# Patient Record
Sex: MALE | Race: WHITE | NOT HISPANIC OR LATINO | Employment: OTHER | URBAN - METROPOLITAN AREA
[De-identification: names, ages, dates, MRNs, and addresses within clinical notes are randomized per-mention and may not be internally consistent; named-entity substitution may affect disease eponyms.]

---

## 2020-01-24 ENCOUNTER — PREP FOR SURGERY (OUTPATIENT)
Dept: OTHER | Facility: HOSPITAL | Age: 70
End: 2020-01-24

## 2020-01-24 ENCOUNTER — OFFICE VISIT (OUTPATIENT)
Dept: GASTROENTEROLOGY | Facility: CLINIC | Age: 70
End: 2020-01-24

## 2020-01-24 VITALS
OXYGEN SATURATION: 99 % | HEART RATE: 60 BPM | WEIGHT: 288 LBS | SYSTOLIC BLOOD PRESSURE: 154 MMHG | BODY MASS INDEX: 40.32 KG/M2 | HEIGHT: 71 IN | DIASTOLIC BLOOD PRESSURE: 80 MMHG | TEMPERATURE: 98.1 F

## 2020-01-24 DIAGNOSIS — K21.00 GASTROESOPHAGEAL REFLUX DISEASE WITH ESOPHAGITIS: Primary | ICD-10-CM

## 2020-01-24 DIAGNOSIS — K63.5 POLYP OF COLON, UNSPECIFIED PART OF COLON, UNSPECIFIED TYPE: Primary | ICD-10-CM

## 2020-01-24 DIAGNOSIS — Z12.11 SCREENING FOR COLON CANCER: ICD-10-CM

## 2020-01-24 DIAGNOSIS — R07.9 CHEST PAIN, UNSPECIFIED TYPE: ICD-10-CM

## 2020-01-24 DIAGNOSIS — K22.70 BARRETT'S ESOPHAGUS WITHOUT DYSPLASIA: ICD-10-CM

## 2020-01-24 DIAGNOSIS — K63.5 POLYP OF COLON, UNSPECIFIED PART OF COLON, UNSPECIFIED TYPE: ICD-10-CM

## 2020-01-24 DIAGNOSIS — K22.4 SPASM OF ESOPHAGUS: ICD-10-CM

## 2020-01-24 PROCEDURE — 99214 OFFICE O/P EST MOD 30 MIN: CPT | Performed by: NURSE PRACTITIONER

## 2020-01-24 RX ORDER — LOSARTAN POTASSIUM 100 MG/1
TABLET ORAL
COMMUNITY
Start: 2019-10-07

## 2020-01-24 RX ORDER — ALLOPURINOL 300 MG/1
TABLET ORAL
COMMUNITY
Start: 2019-05-01

## 2020-01-24 RX ORDER — OMEPRAZOLE 40 MG/1
CAPSULE, DELAYED RELEASE ORAL
COMMUNITY
Start: 2019-05-09 | End: 2022-06-30

## 2020-01-24 RX ORDER — METHOCARBAMOL 500 MG/1
TABLET, FILM COATED ORAL
COMMUNITY
Start: 2019-08-07 | End: 2022-06-30

## 2020-01-24 RX ORDER — LEVOTHYROXINE SODIUM 0.05 MG/1
TABLET ORAL
COMMUNITY
Start: 2019-10-07

## 2020-01-24 RX ORDER — ASPIRIN 81 MG/1
81 TABLET ORAL DAILY
COMMUNITY

## 2020-01-24 RX ORDER — PRAVASTATIN SODIUM 40 MG
TABLET ORAL
COMMUNITY
Start: 2019-05-09 | End: 2022-05-20 | Stop reason: ALTCHOICE

## 2020-01-24 RX ORDER — TRAMADOL HYDROCHLORIDE 50 MG/1
50 TABLET ORAL
COMMUNITY
Start: 2019-12-09 | End: 2021-04-13 | Stop reason: ALTCHOICE

## 2020-01-24 RX ORDER — ATENOLOL 25 MG/1
TABLET ORAL
COMMUNITY
Start: 2019-04-25 | End: 2022-07-12

## 2020-01-24 RX ORDER — HYDROCHLOROTHIAZIDE 12.5 MG/1
CAPSULE, GELATIN COATED ORAL
COMMUNITY
Start: 2020-01-15

## 2020-01-24 NOTE — PROGRESS NOTES
"Abdominal Pain; Crohn's Disease; and Chest Pain      HPI  69-year-old male presents the office today for follow-up.  He was a former patient in our previous practice.  He has a history of GERD and Raphael's esophagus and underwent EGD on 10/15/2019 for surveillance.  EGD revealed mucosal changes of Raphael's esophagus in the distal esophagus, one small 3 mm gastric polyp, and changes consistent with previous Nissen fundoplication.  Stomach antrum biopsy revealed mild gastritis.  Stomach polyp was identified as hyperplastic on pathology.  Esophagus biopsies at 42 cm, 41 cm, and 40 cm revealed mild chronic inflammation, negative for intestinal metaplasia and dysplasia.    Patient reports a longstanding history of GERD and currently takes omeprazole 40 mg twice daily.  He experienced some chest discomfort near the mid esophageal region in December and extending into January, and initial symptoms began a few days after martina a cold.  Symptoms can radiate downward on his left side. He describes the discomfort as a dull \"spasm\" pain that can last anywhere from a few hours to a few days.  He reports having a prescription for tramadol which he has used during an episode of chest discomfort, which helped some.  He denies any SOA, numbness, or radiation of discomfort into his jaw or left arm. He reports following up with his PCP this month and states EKG was performed with normal findings.  He does have a history of coronary artery disease and has a stent to the LAD.  He reports that he is due for his annual follow-up with his cardiologist.  He reports being given a GI cocktail by his PCP with some alleviation of discomfort initially, but symptoms returned quickly.  He reports using Tums and Mylanta OTC for intermittent relief, and states that the Tums helped somewhat but Mylanta was ineffective.  He reports that eating nuts, raw carrots, and salad seems to worsen chest discomfort.  He has a history of a Nissen " fundoplication greater than 20 years ago.  Has any nausea, vomiting, or dysphagia.  He is a non-smoker and drinks less than 1 alcoholic beverage per month.    He reports that he is due for his surveillance colonoscopy and has a history of colon polyps.  He states that it has been greater than 5 years since his last colonoscopy.  He denies any change in bowel habits, diarrhea, constipation, melena, or hematochezia.  He denies any family history of colon cancer.    Review of Systems   Constitutional: Negative.    HENT: Positive for rhinorrhea.    Eyes: Negative.    Respiratory: Negative.    Cardiovascular: Positive for chest pain.   Gastrointestinal: Positive for abdominal pain.   Endocrine: Positive for polyuria.   Genitourinary: Positive for frequency.   Musculoskeletal: Positive for arthralgias.   Skin: Negative.    Allergic/Immunologic: Negative.    Neurological: Negative.    Hematological: Negative.    Psychiatric/Behavioral: Positive for sleep disturbance.        Problem List:  There is no problem list on file for this patient.      Medical History:    Past Medical History:   Diagnosis Date   • Crohn's disease (CMS/HCC)    • Diverticulitis of colon    • GERD (gastroesophageal reflux disease)         Social History:    Social History     Socioeconomic History   • Marital status:      Spouse name: Not on file   • Number of children: Not on file   • Years of education: Not on file   • Highest education level: Not on file       Family History: History reviewed. No pertinent family history.    Surgical History:   Past Surgical History:   Procedure Laterality Date   • COLONOSCOPY  approx 2017   • UPPER GASTROINTESTINAL ENDOSCOPY  2019         Current Outpatient Medications:   •  allopurinol (ZYLOPRIM) 300 MG tablet, TAKE 1 TABLET EVERY DAY, Disp: , Rfl:   •  aspirin 81 MG EC tablet, Take 81 mg by mouth Daily., Disp: , Rfl:   •  atenolol (TENORMIN) 25 MG tablet, TAKE 1 TABLET EVERY DAY, Disp: , Rfl:   •   hydroCHLOROthiazide (MICROZIDE) 12.5 MG capsule, TAKE 1 CAPSULE EVERY DAY, Disp: , Rfl:   •  levothyroxine (SYNTHROID, LEVOTHROID) 50 MCG tablet, TAKE 1 TABLET EVERY DAY, Disp: , Rfl:   •  losartan (COZAAR) 100 MG tablet, TAKE 1 TABLET EVERY DAY, Disp: , Rfl:   •  methocarbamol (ROBAXIN) 500 MG tablet, TAKE 1 TABLET FOUR TIMES DAILY, Disp: , Rfl:   •  Nystatin (JUANCHO'S MAGIC MIXTURE), SWISH AND SWALLOW 15 ML FOUR TIMES DAILY FOR 1 DAY.(DIPHENHYDRAMINE 12.5MG/5ML, NYSTATIN SUSP, LIDOCAINE 2%), Disp: , Rfl:   •  omeprazole (priLOSEC) 40 MG capsule, TAKE 1 CAPSULE TWICE DAILY, Disp: , Rfl:   •  Potassium 99 MG tablet, Take 99 mg by mouth Daily., Disp: , Rfl:   •  pravastatin (PRAVACHOL) 40 MG tablet, TAKE 1 TABLET EVERY NIGHT, Disp: , Rfl:   •  traMADol (ULTRAM) 50 MG tablet, Take 50 mg by mouth., Disp: , Rfl:   •  hyoscyamine (LEVSIN) 0.125 MG SL tablet, May place 1-2 tabs under the tongue up to 4 x daily as needed for esophageal spasms, Disp: 60 tablet, Rfl: 2    Allergies:   Allergies   Allergen Reactions   • Sulfa Antibiotics Shortness Of Breath   • Doxycycline Palpitations        Vitals:    01/24/20 1039   BP: 154/80   Pulse: 60   Temp: 98.1 °F (36.7 °C)   SpO2: 99%       Physical Exam   Constitutional: He is oriented to person, place, and time. He appears well-developed and well-nourished.   Pulmonary/Chest: Effort normal.   Abdominal: Soft. Bowel sounds are normal. He exhibits no distension and no mass. There is no tenderness. There is no guarding.   Musculoskeletal: Normal range of motion.   Neurological: He is alert and oriented to person, place, and time.   Skin: Skin is warm and dry.   Psychiatric: He has a normal mood and affect. His behavior is normal. Judgment and thought content normal.   Vitals reviewed.      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  -     atenolol (TENORMIN) 25 MG tablet; TAKE 1 TABLET EVERY DAY  -     hydroCHLOROthiazide (MICROZIDE) 12.5 MG capsule; TAKE 1 CAPSULE EVERY DAY  -     levothyroxine (SYNTHROID, LEVOTHROID) 50 MCG tablet; TAKE 1 TABLET EVERY DAY  -     losartan (COZAAR) 100 MG tablet; TAKE 1 TABLET EVERY DAY  -     methocarbamol (ROBAXIN) 500 MG tablet; TAKE 1 TABLET FOUR TIMES DAILY  -     omeprazole (priLOSEC) 40 MG capsule; TAKE 1 CAPSULE TWICE DAILY  -     Potassium 99 MG tablet; Take 99 mg by mouth Daily.  -     pravastatin (PRAVACHOL) 40 MG tablet; TAKE 1 TABLET EVERY NIGHT  -     traMADol (ULTRAM) 50 MG tablet; Take 50 mg by mouth.  -     hyoscyamine (LEVSIN) 0.125 MG SL tablet; May place 1-2 tabs under the tongue up to 4 x daily as needed for esophageal spasms        Return in about 3 months (around 4/24/2020) for Next scheduled follow up at [facility].      Discussion:  EGD findings and pathology from 10/15/2019 procedure reviewed with the patient today during his office visit.  I suspect that the patient's atypical chest discomfort is secondary to esophageal spasms, rather than GERD.  We will have the patient start hyoscyamine on a as needed basis to see if symptoms improve.  Due to his history of coronary artery disease, he was recommended to schedule his annual follow-up with cardiology and was advised that if the hyoscyamine was ineffective and the chest discomfort persisted to go to the ER for further evaluation.  Plan for office follow-up in 3 months for reassessment of symptoms.  Patient verbalized understanding of  above.    We will also schedule the patient surveillance colonoscopy at this time due to his history of colon polyps.

## 2020-01-24 NOTE — PATIENT INSTRUCTIONS
1.  For your history of Raphael's esophagus and GERD continue omeprazole 40 mg twice daily.    2.  For GERD, we recommend avoiding eating 3-4 hours before bedtime, eating smaller more frequent meals, and avoiding any known food triggers including spicy foods, tomatoes and tomato-based sauces, chocolate, coffee/tea, citrus fruits, carbonated  beverages and alcohol.     3.  For suspected esophageal spasms, a prescription for hyoscyamine has been sent to your pharmacy. You may take 1-2 tabs up to 4 x daily as needed for symptoms. If this medication does not help to alleviate your discomfort and the discomfort is severe, would recommend proceeding to the ER for further evaluation.    4.  Recommend that you schedule your annual follow-up with cardiology.    5.  You may continue to use OTC antacids such as TUMS or Gaviscon for intermittent relief of heartburn or reflux symptoms. Follow  package instructions for use.    6.  Due to your history of colon polyps, we will schedule a surveillance colonoscopy at this time.

## 2020-09-11 ENCOUNTER — OFFICE VISIT (OUTPATIENT)
Dept: GASTROENTEROLOGY | Facility: CLINIC | Age: 70
End: 2020-09-11

## 2020-09-11 ENCOUNTER — PREP FOR SURGERY (OUTPATIENT)
Dept: OTHER | Facility: HOSPITAL | Age: 70
End: 2020-09-11

## 2020-09-11 VITALS
OXYGEN SATURATION: 98 % | DIASTOLIC BLOOD PRESSURE: 78 MMHG | WEIGHT: 290 LBS | TEMPERATURE: 97.6 F | SYSTOLIC BLOOD PRESSURE: 122 MMHG | HEIGHT: 71 IN | BODY MASS INDEX: 40.6 KG/M2 | HEART RATE: 62 BPM

## 2020-09-11 DIAGNOSIS — R10.13 EPIGASTRIC PAIN: Primary | ICD-10-CM

## 2020-09-11 DIAGNOSIS — Z12.11 COLON CANCER SCREENING: ICD-10-CM

## 2020-09-11 DIAGNOSIS — K21.9 GASTROESOPHAGEAL REFLUX DISEASE, ESOPHAGITIS PRESENCE NOT SPECIFIED: ICD-10-CM

## 2020-09-11 DIAGNOSIS — R10.12 LEFT UPPER QUADRANT ABDOMINAL PAIN: ICD-10-CM

## 2020-09-11 DIAGNOSIS — K22.70 BARRETT'S ESOPHAGUS WITHOUT DYSPLASIA: ICD-10-CM

## 2020-09-11 DIAGNOSIS — Z86.010 HISTORY OF COLON POLYPS: ICD-10-CM

## 2020-09-11 PROCEDURE — 99214 OFFICE O/P EST MOD 30 MIN: CPT | Performed by: NURSE PRACTITIONER

## 2020-09-11 NOTE — PATIENT INSTRUCTIONS
1.  For GERD, we have given you samples of Dexilant 60 mg.  Please replace your morning dose of omeprazole with the Dexilant and continue omeprazole 40 mg 1 hour before your evening meal.  Please contact our office at [phone] with an update in 2 weeks to let us know if replacing your morning dose of PPI was Dexilant works better.    2.  For GERD, we recommend avoiding eating 3-4 hours before bedtime, eating smaller more frequent meals, and avoiding any known food triggers including spicy foods, tomatoes and tomato-based sauces, chocolate, coffee/tea, citrus fruits, carbonated  beverages and alcohol.     3.  For further evaluation of epigastric pain given your history of GERD and Raphael's esophagus we will schedule an EGD.    4.  You may continue to use GI cocktail as prescribed by PCP.    5.  Your history of colon polyps, we will schedule your surveillance colonoscopy.    6.  Additionally for further evaluation of epigastric and left upper quadrant abdominal pain we will obtain a CBC, CMP, amylase, and lipase level.  We will contact you with results once available.    7.  Recommend telephone office follow-up 2 to 4 weeks after EGD and colonoscopy to discuss results and reassess symptoms.

## 2020-09-12 LAB
ALBUMIN SERPL-MCNC: 4.7 G/DL (ref 3.5–5.2)
ALBUMIN/GLOB SERPL: 2.2 G/DL
ALP SERPL-CCNC: 53 U/L (ref 39–117)
ALT SERPL-CCNC: 84 U/L (ref 1–41)
AMYLASE SERPL-CCNC: 110 U/L (ref 28–100)
AST SERPL-CCNC: 67 U/L (ref 1–40)
BASOPHILS # BLD AUTO: 0.07 10*3/MM3 (ref 0–0.2)
BASOPHILS NFR BLD AUTO: 0.9 % (ref 0–1.5)
BILIRUB SERPL-MCNC: 0.5 MG/DL (ref 0–1.2)
BUN SERPL-MCNC: 21 MG/DL (ref 8–23)
BUN/CREAT SERPL: 20.4 (ref 7–25)
CALCIUM SERPL-MCNC: 9.5 MG/DL (ref 8.6–10.5)
CHLORIDE SERPL-SCNC: 100 MMOL/L (ref 98–107)
CO2 SERPL-SCNC: 25.7 MMOL/L (ref 22–29)
CREAT SERPL-MCNC: 1.03 MG/DL (ref 0.76–1.27)
EOSINOPHIL # BLD AUTO: 0.13 10*3/MM3 (ref 0–0.4)
EOSINOPHIL NFR BLD AUTO: 1.6 % (ref 0.3–6.2)
ERYTHROCYTE [DISTWIDTH] IN BLOOD BY AUTOMATED COUNT: 13.6 % (ref 12.3–15.4)
GLOBULIN SER CALC-MCNC: 2.1 GM/DL
GLUCOSE SERPL-MCNC: 95 MG/DL (ref 65–99)
HCT VFR BLD AUTO: 43.7 % (ref 37.5–51)
HGB BLD-MCNC: 14.6 G/DL (ref 13–17.7)
IMM GRANULOCYTES # BLD AUTO: 0.04 10*3/MM3 (ref 0–0.05)
IMM GRANULOCYTES NFR BLD AUTO: 0.5 % (ref 0–0.5)
LIPASE SERPL-CCNC: 39 U/L (ref 13–60)
LYMPHOCYTES # BLD AUTO: 2.8 10*3/MM3 (ref 0.7–3.1)
LYMPHOCYTES NFR BLD AUTO: 34.1 % (ref 19.6–45.3)
MCH RBC QN AUTO: 31.3 PG (ref 26.6–33)
MCHC RBC AUTO-ENTMCNC: 33.4 G/DL (ref 31.5–35.7)
MCV RBC AUTO: 93.8 FL (ref 79–97)
MONOCYTES # BLD AUTO: 0.59 10*3/MM3 (ref 0.1–0.9)
MONOCYTES NFR BLD AUTO: 7.2 % (ref 5–12)
NEUTROPHILS # BLD AUTO: 4.57 10*3/MM3 (ref 1.7–7)
NEUTROPHILS NFR BLD AUTO: 55.7 % (ref 42.7–76)
NRBC BLD AUTO-RTO: 0 /100 WBC (ref 0–0.2)
PLATELET # BLD AUTO: 221 10*3/MM3 (ref 140–450)
POTASSIUM SERPL-SCNC: 4.7 MMOL/L (ref 3.5–5.2)
PROT SERPL-MCNC: 6.8 G/DL (ref 6–8.5)
RBC # BLD AUTO: 4.66 10*6/MM3 (ref 4.14–5.8)
SODIUM SERPL-SCNC: 134 MMOL/L (ref 136–145)
WBC # BLD AUTO: 8.2 10*3/MM3 (ref 3.4–10.8)

## 2020-09-15 DIAGNOSIS — R74.8 ELEVATED LIVER ENZYMES: Primary | ICD-10-CM

## 2020-09-15 DIAGNOSIS — R74.01 NONSPECIFIC ELEVATION OF LEVELS OF TRANSAMINASE AND LACTIC ACID DEHYDROGENASE (LDH): ICD-10-CM

## 2020-09-15 DIAGNOSIS — R10.13 EPIGASTRIC PAIN: ICD-10-CM

## 2020-09-15 DIAGNOSIS — R10.12 LEFT UPPER QUADRANT ABDOMINAL PAIN: ICD-10-CM

## 2020-09-15 DIAGNOSIS — R74.02 NONSPECIFIC ELEVATION OF LEVELS OF TRANSAMINASE AND LACTIC ACID DEHYDROGENASE (LDH): ICD-10-CM

## 2020-09-27 ENCOUNTER — HOSPITAL ENCOUNTER (OUTPATIENT)
Dept: CT IMAGING | Facility: HOSPITAL | Age: 70
Discharge: HOME | End: 2020-09-27
Admitting: NURSE PRACTITIONER

## 2020-09-27 DIAGNOSIS — R10.13 EPIGASTRIC PAIN: ICD-10-CM

## 2020-09-27 DIAGNOSIS — R10.12 LEFT UPPER QUADRANT ABDOMINAL PAIN: ICD-10-CM

## 2020-09-27 DIAGNOSIS — R74.8 ELEVATED LIVER ENZYMES: ICD-10-CM

## 2020-09-27 LAB
ALPHA1 GLOB MFR UR ELPH: 127 MG/DL (ref 90–200)
CERULOPLASMIN SERPL-MCNC: 21 MG/DL (ref 16–31)
FERRITIN SERPL-MCNC: 605 NG/ML (ref 30–400)
GGT SERPL-CCNC: 41 U/L (ref 8–61)
HAV IGM SERPL QL IA: NORMAL
HBV CORE IGM SERPL QL IA: NORMAL
HBV SURFACE AG SERPL QL IA: NORMAL
HCV AB SER DONR QL: NORMAL
IGA1 MFR SER: 267 MG/DL (ref 70–400)
IGG1 SER-MCNC: 1054 MG/DL (ref 700–1600)
IGM SERPL-MCNC: 59 MG/DL (ref 40–230)
IRON 24H UR-MRATE: 118 MCG/DL (ref 59–158)
IRON SATN MFR SERPL: 26 % (ref 20–50)
TIBC SERPL-MCNC: 459 MCG/DL (ref 298–536)
TRANSFERRIN SERPL-MCNC: 308 MG/DL (ref 200–360)

## 2020-09-27 PROCEDURE — 83516 IMMUNOASSAY NONANTIBODY: CPT | Performed by: NURSE PRACTITIONER

## 2020-09-27 PROCEDURE — 80074 ACUTE HEPATITIS PANEL: CPT | Performed by: NURSE PRACTITIONER

## 2020-09-27 PROCEDURE — 82565 ASSAY OF CREATININE: CPT

## 2020-09-27 PROCEDURE — 86038 ANTINUCLEAR ANTIBODIES: CPT | Performed by: NURSE PRACTITIONER

## 2020-09-27 PROCEDURE — 82103 ALPHA-1-ANTITRYPSIN TOTAL: CPT | Performed by: NURSE PRACTITIONER

## 2020-09-27 PROCEDURE — 82390 ASSAY OF CERULOPLASMIN: CPT | Performed by: NURSE PRACTITIONER

## 2020-09-27 PROCEDURE — 0 DIATRIZOATE MEGLUMINE & SODIUM PER 1 ML: Performed by: NURSE PRACTITIONER

## 2020-09-27 PROCEDURE — 86376 MICROSOMAL ANTIBODY EACH: CPT | Performed by: NURSE PRACTITIONER

## 2020-09-27 PROCEDURE — 82784 ASSAY IGA/IGD/IGG/IGM EACH: CPT | Performed by: NURSE PRACTITIONER

## 2020-09-27 PROCEDURE — 25010000002 IOPAMIDOL 61 % SOLUTION: Performed by: NURSE PRACTITIONER

## 2020-09-27 PROCEDURE — 36415 COLL VENOUS BLD VENIPUNCTURE: CPT | Performed by: NURSE PRACTITIONER

## 2020-09-27 PROCEDURE — 83540 ASSAY OF IRON: CPT | Performed by: NURSE PRACTITIONER

## 2020-09-27 PROCEDURE — 82977 ASSAY OF GGT: CPT | Performed by: NURSE PRACTITIONER

## 2020-09-27 PROCEDURE — 86255 FLUORESCENT ANTIBODY SCREEN: CPT | Performed by: NURSE PRACTITIONER

## 2020-09-27 PROCEDURE — 74177 CT ABD & PELVIS W/CONTRAST: CPT

## 2020-09-27 PROCEDURE — 84466 ASSAY OF TRANSFERRIN: CPT | Performed by: NURSE PRACTITIONER

## 2020-09-27 PROCEDURE — 82728 ASSAY OF FERRITIN: CPT | Performed by: NURSE PRACTITIONER

## 2020-09-27 RX ADMIN — IOPAMIDOL 100 ML: 612 INJECTION, SOLUTION INTRAVENOUS at 10:46

## 2020-09-27 RX ADMIN — DIATRIZOATE MEGLUMINE AND DIATRIZOATE SODIUM 30 ML: 600; 100 SOLUTION ORAL; RECTAL at 10:46

## 2020-09-28 LAB — CREAT BLDA-MCNC: 1 MG/DL (ref 0.6–1.3)

## 2020-09-29 LAB
ACTIN IGG SERPL-ACNC: 10 UNITS (ref 0–19)
ANA SER QL: NEGATIVE
ENDOMYSIUM IGA SER QL: NEGATIVE
IGA SERPL-MCNC: 282 MG/DL (ref 61–437)
LKM-1 AB SER-ACNC: <1 UNITS (ref 0–20)
MITOCHONDRIA M2 IGG SER-ACNC: <20 UNITS (ref 0–20)
TTG IGA SER-ACNC: 3 U/ML (ref 0–3)

## 2020-10-14 ENCOUNTER — LAB REQUISITION (OUTPATIENT)
Dept: LAB | Facility: HOSPITAL | Age: 70
End: 2020-10-14

## 2020-10-14 DIAGNOSIS — Z00.00 ENCOUNTER FOR GENERAL ADULT MEDICAL EXAMINATION WITHOUT ABNORMAL FINDINGS: ICD-10-CM

## 2020-10-16 PROCEDURE — U0004 COV-19 TEST NON-CDC HGH THRU: HCPCS | Performed by: INTERNAL MEDICINE

## 2020-10-17 LAB — SARS-COV-2 RNA RESP QL NAA+PROBE: NOT DETECTED

## 2020-10-19 ENCOUNTER — LAB REQUISITION (OUTPATIENT)
Dept: LAB | Facility: HOSPITAL | Age: 70
End: 2020-10-19

## 2020-10-19 ENCOUNTER — OUTSIDE FACILITY SERVICE (OUTPATIENT)
Dept: GASTROENTEROLOGY | Facility: CLINIC | Age: 70
End: 2020-10-19

## 2020-10-19 DIAGNOSIS — R10.13 EPIGASTRIC PAIN: ICD-10-CM

## 2020-10-19 DIAGNOSIS — Z00.00 ENCOUNTER FOR GENERAL ADULT MEDICAL EXAMINATION WITHOUT ABNORMAL FINDINGS: ICD-10-CM

## 2020-10-19 PROCEDURE — 87081 CULTURE SCREEN ONLY: CPT | Performed by: INTERNAL MEDICINE

## 2020-10-19 PROCEDURE — 45380 COLONOSCOPY AND BIOPSY: CPT | Performed by: INTERNAL MEDICINE

## 2020-10-19 PROCEDURE — 88305 TISSUE EXAM BY PATHOLOGIST: CPT | Performed by: INTERNAL MEDICINE

## 2020-10-19 PROCEDURE — 43239 EGD BIOPSY SINGLE/MULTIPLE: CPT | Performed by: INTERNAL MEDICINE

## 2020-10-20 ENCOUNTER — TELEPHONE (OUTPATIENT)
Dept: GASTROENTEROLOGY | Facility: CLINIC | Age: 70
End: 2020-10-20

## 2020-10-20 LAB
LAB AP CASE REPORT: NORMAL
LAB AP CLINICAL INFORMATION: NORMAL
PATH REPORT.FINAL DX SPEC: NORMAL
PATH REPORT.GROSS SPEC: NORMAL

## 2020-10-20 NOTE — TELEPHONE ENCOUNTER
Torrie,  I discussed path report with patient and scheduled a f/u appt with you next month.  He sent a My Chart message with questions regarding his lab results because there are some abnormalities.    Please advise.  See his My Chart message.    Thanks,  Nikki

## 2020-10-21 LAB — UREASE TISS QL: NEGATIVE

## 2020-10-21 NOTE — TELEPHONE ENCOUNTER
The only abnormality noted on his lab work was a very mildly elevated amylase, which could just be reactive as well as an elevated ferritin level, which could also be reactive.  Otherwise, his lab work was unremarkable.  His CT scan of the abdomen and pelvis revealed diverticulosis and fatty liver.  I sent the patient a my chart message regarding his CT scan findings, as his lab work had not yet returned.  You can contact the patient and notify him of the above.  For fatty liver, the recommended treatment is weight loss along with regular exercise, avoidance of high fat/high sugar foods, and alcohol avoidance.  We also recommend the addition of a daily milk thistle supplement, which can be purchased over-the-counter at his local grocery or pharmacy.  We recommend 1 capsule of milk thistle daily.  Milk thistle helps to about antioxidant support to the liver. If he has any additional questions, please feel free to send them my way, or I can address them in his follow-up visit.  If he would like to get in sooner for a visit, that would also be fine.     It appears that based upon review of his EGD and colonoscopy, there were esophageal changes suspicious for short segment Raphael's esophagus; however pathology was negative.  Colonoscopy revealed one 4 mm polyp, and anal stricture, and diverticulosis.  Patient was recommended to undergo his next surveillance colonoscopy at a 5-year interval.  Please let me know if the patient has any additional questions.    Discussed with patient. lynn

## 2020-11-13 ENCOUNTER — OFFICE VISIT (OUTPATIENT)
Dept: GASTROENTEROLOGY | Facility: CLINIC | Age: 70
End: 2020-11-13

## 2020-11-13 DIAGNOSIS — Z86.010 HX OF ADENOMATOUS COLONIC POLYPS: ICD-10-CM

## 2020-11-13 DIAGNOSIS — K22.70 BARRETT'S ESOPHAGUS WITHOUT DYSPLASIA: ICD-10-CM

## 2020-11-13 DIAGNOSIS — R14.0 BLOATING: ICD-10-CM

## 2020-11-13 DIAGNOSIS — K21.9 GASTROESOPHAGEAL REFLUX DISEASE WITHOUT ESOPHAGITIS: Primary | ICD-10-CM

## 2020-11-13 DIAGNOSIS — K76.0 FATTY LIVER: ICD-10-CM

## 2020-11-13 PROCEDURE — 99442 PR PHYS/QHP TELEPHONE EVALUATION 11-20 MIN: CPT | Performed by: NURSE PRACTITIONER

## 2020-11-13 NOTE — PROGRESS NOTES
Chief Complaint   Patient presents with   • Follow-up     post EGD/CLS       HPI  70-year-old male presents today for telephone follow-up.  He was last seen in office on 9/11/2020.  He has a history of GERD, Nissen fundoplication and reversal, Raphael's esophagus, and colon polyps.    He underwent EGD and colonoscopy on 10/19/2020.  EGD revealed mucosal changes consistent with short segment Raphael's esophagus and gastritis.  Stomach biopsy was unremarkable.  Esophagus biopsy at 38 cm revealed squamocolumnar mucosa with chronic inflammation and reactive foveolar hyperplasia.  Esophagus biopsy at 37 cm demonstrated squamous mucosa; no presence of colon or mucosa noted.  Colonoscopy revealed a 4 mm tubular adenoma in the cecum, diverticulosis of the sigmoid colon, and anal stricture on digital rectal exam.  Sigmoid colon polypectomy identified fragments of a hyperplastic polyp.  He was recommended to undergo his next colonoscopy at a 5-year interval.    He reports that use of omeprazole 40 mg twice daily has been working fairly well to manage his heartburn and reflux symptoms.  He reports severe epigastric pain has occurred 1-2 times since his last office visit, but will occasionally experience mild symptoms.  He has a prescription for Magic mouthwash that is provided by his PCP, which he states helps with epigastric pain.  He reports following up with cardiology, and cardiac etiology has been ruled out.  He denies any nausea, vomiting, or dysphagia.    He reports significant abdominal bloating throughout the night and will experience flatulence.  He takes Gas-X on an as-needed basis at night.  He reports having regular bowel movements most days.  He denies any melena or hematochezia.  He denies any diarrhea or constipation.  He does report a history of a hemorrhoidectomy and feels that his stricture noted on his colonoscopy was secondary to scar tissue.     He has a history of elevated liver enzymes and fatty liver,  noted on CT scan of the abdomen pelvis on 9/15/2020.  Recent CMP demonstrated an AST of 67 and an ALT of 84.  Full liver lab work-up was unremarkable.  He will be due for his next hepatic function panel March 15, 2020.    You have chosen to receive care through a telephone visit today. Do you consent to use a telephone visit for your medical care today? Yes      Review of Systems   Constitutional: Negative for appetite change, chills, diaphoresis, fatigue, fever and unexpected weight change.   HENT: Negative for dental problem, ear pain, mouth sores, rhinorrhea, sore throat and voice change.    Eyes: Negative for pain, redness and visual disturbance.   Respiratory: Negative for cough, chest tightness and wheezing.    Cardiovascular: Negative for chest pain, palpitations and leg swelling.   Endocrine: Negative for cold intolerance, heat intolerance, polydipsia, polyphagia and polyuria.   Genitourinary: Negative for dysuria, frequency, hematuria and urgency.   Musculoskeletal: Negative for arthralgias, back pain, joint swelling, myalgias and neck pain.   Skin: Negative for rash.   Allergic/Immunologic: Negative for environmental allergies, food allergies and immunocompromised state.   Neurological: Negative for dizziness, seizures, weakness, numbness and headaches.   Hematological: Does not bruise/bleed easily.   Psychiatric/Behavioral: Negative for sleep disturbance. The patient is not nervous/anxious.        Problem List:  There is no problem list on file for this patient.      Medical History:    Past Medical History:   Diagnosis Date   • Diverticulitis of colon    • GERD (gastroesophageal reflux disease)         Social History:    Social History     Socioeconomic History   • Marital status: Significant Other     Spouse name: Not on file   • Number of children: Not on file   • Years of education: Not on file   • Highest education level: Not on file   Tobacco Use   • Smoking status: Never Smoker   • Smokeless  tobacco: Never Used   Substance and Sexual Activity   • Alcohol use: Not Currently     Frequency: Never   • Drug use: Never   • Sexual activity: Defer       Family History:   Family History   Problem Relation Age of Onset   • Colon polyps Father    • Colon polyps Brother    • Colon cancer Neg Hx        Surgical History:   Past Surgical History:   Procedure Laterality Date   • COLONOSCOPY  approx 2017   • UPPER GASTROINTESTINAL ENDOSCOPY  2019         Current Outpatient Medications:   •  allopurinol (ZYLOPRIM) 300 MG tablet, TAKE 1 TABLET EVERY DAY, Disp: , Rfl:   •  aspirin 81 MG EC tablet, Take 81 mg by mouth Daily., Disp: , Rfl:   •  atenolol (TENORMIN) 25 MG tablet, TAKE 1 TABLET EVERY DAY, Disp: , Rfl:   •  hydroCHLOROthiazide (MICROZIDE) 12.5 MG capsule, TAKE 1 CAPSULE EVERY DAY, Disp: , Rfl:   •  levothyroxine (SYNTHROID, LEVOTHROID) 50 MCG tablet, TAKE 1 TABLET EVERY DAY, Disp: , Rfl:   •  losartan (COZAAR) 100 MG tablet, TAKE 1 TABLET EVERY DAY, Disp: , Rfl:   •  methocarbamol (ROBAXIN) 500 MG tablet, TAKE 1 TABLET FOUR TIMES DAILY, Disp: , Rfl:   •  MILK THISTLE PO, Take  by mouth Daily., Disp: , Rfl:   •  Nystatin (JUANCHO'S MAGIC MIXTURE), SWISH AND SWALLOW 15 ML FOUR TIMES DAILY FOR 1 DAY.(DIPHENHYDRAMINE 12.5MG/5ML, NYSTATIN SUSP, LIDOCAINE 2%), Disp: , Rfl:   •  omeprazole (priLOSEC) 40 MG capsule, TAKE 1 CAPSULE TWICE DAILY, Disp: , Rfl:   •  Potassium 99 MG tablet, Take 99 mg by mouth Daily., Disp: , Rfl:   •  pravastatin (PRAVACHOL) 40 MG tablet, TAKE 1 TABLET EVERY NIGHT, Disp: , Rfl:   •  traMADol (ULTRAM) 50 MG tablet, Take 50 mg by mouth., Disp: , Rfl:   •  hyoscyamine (LEVSIN) 0.125 MG SL tablet, May place 1-2 tabs under the tongue up to 4 x daily as needed for esophageal spasms, Disp: 60 tablet, Rfl: 2    Allergies:  Sulfa antibiotics and Doxycycline    The following portions of the patient's history were reviewed and updated as appropriate: allergies, current medications, past family history,  past medical history, past social history, past surgical history and problem list.    Assessment/ Plan  Diagnoses and all orders for this visit:    1. Gastroesophageal reflux disease without esophagitis (Primary)    2. Raphael's esophagus without dysplasia    3. Hx of adenomatous colonic polyps    4. Bloating    5. Fatty liver         No follow-ups on file.    Patient Instructions   1.  For GERD and Raphael's esophagus, continue omeprazole 40 mg twice daily.  This prescription is provided by your PCP.  Due to your history of Raphael's esophagus, we will place you recall for your next surveillance EGD in 3 years, which will be due on 10/19/2023.    2. For epigastric pain, we have sent in refills for sucralfate. You will take 1 tab and let dissolve in 15 mL of water to make a slurry and swallow up to twice daily as needed.  You may also continue to use Magic Mouthwash as prescribed by your PCP office.    3. For abdominal bloating and dyspepsia we will have samples of FDGard available for you to  at our . This will help to manage abdominal bloating and dyspepsia. Please follow package instructions for use.     4. For fatty liver, continue efforts towards weight loss, great job with current loss of 18 pounds.  Continue to avoid high fat/high sugar foods and participate in regular exercise.  We recommend avoidance of alcohol.  Continue milk thistle supplement daily.  We will plan to recheck your liver enzymes again in March 2021, every 6 months.    5.  Due to your history of colon polyps, we have placed you in our electronic reminder system for your next surveillance colonoscopy in 5 years, which will be due on 10/19/2025.    6.  Plan for office follow-up in 3 months for reassessment of symptoms, or sooner should symptoms worsen or fail to improve.      Discussion:  EGD and colonoscopy findings and pathology reviewed with patient today during his office visit.  Patient to continue omeprazole 40 mg twice  daily and add sucralfate up to twice daily as needed for symptom management.  Patient was recommended to come by the office and  samples of FD guard to assist in the management of functional dyspepsia.  I also feel that this would be beneficial for management of his abdominal bloating.    To consider use of a daily probiotic or the low FODMAP diet in the future if bloating persists despite FD guard use.  Also to consider testing for small bowel bacterial overgrowth in the future.    For fatty liver, patient has done very well in weight reduction and is lost a total of 18 pounds since his last office visit.  He continues milk thistle daily.  He will be due for his next hepatic function panel March 2021.  We will plan for office follow-up in 3 months for reassessment of symptoms on 2/12/2021, or sooner should symptoms worsen or fail to improve.  Patient verbalized understand of above.  All questions answered and support provided.    This visit was completed as a telephone visit due to COVID-19 pandemic. This visit has been rescheduled as a phone visit to comply with patient safety concerns in accordance with CDC recommendations. Total time of discussion was 18 minutes.

## 2020-11-13 NOTE — PATIENT INSTRUCTIONS
1.  For GERD and Raphael's esophagus, continue omeprazole 40 mg twice daily.  This prescription is provided by your PCP.  Due to your history of Raphael's esophagus, we will place you recall for your next surveillance EGD in 3 years, which will be due on 10/19/2023.    2. For epigastric pain, we have sent in refills for sucralfate. You will take 1 tab and let dissolve in 15 mL of water to make a slurry and swallow up to twice daily as needed.  You may also continue to use Magic Mouthwash as prescribed by your PCP office.    3. For abdominal bloating and dyspepsia we will have samples of FDGard available for you to  at our . This will help to manage abdominal bloating and dyspepsia. Please follow package instructions for use.     4. For fatty liver, continue efforts towards weight loss, great job with current loss of 18 pounds.  Continue to avoid high fat/high sugar foods and participate in regular exercise.  We recommend avoidance of alcohol.  Continue milk thistle supplement daily.  We will plan to recheck your liver enzymes again in March 2021, every 6 months.    5.  Due to your history of colon polyps, we have placed you in our electronic reminder system for your next surveillance colonoscopy in 5 years, which will be due on 10/19/2025.    6.  Plan for office follow-up in 3 months for reassessment of symptoms, or sooner should symptoms worsen or fail to improve.

## 2020-11-18 RX ORDER — SUCRALFATE 1 G/1
1 TABLET ORAL 2 TIMES DAILY
Qty: 60 TABLET | Refills: 5 | Status: SHIPPED | OUTPATIENT
Start: 2020-11-18 | End: 2022-05-20 | Stop reason: SINTOL

## 2021-02-12 ENCOUNTER — OFFICE VISIT (OUTPATIENT)
Dept: GASTROENTEROLOGY | Facility: CLINIC | Age: 71
End: 2021-02-12

## 2021-02-12 DIAGNOSIS — K21.9 GASTROESOPHAGEAL REFLUX DISEASE, UNSPECIFIED WHETHER ESOPHAGITIS PRESENT: Primary | Chronic | ICD-10-CM

## 2021-02-12 DIAGNOSIS — Z86.010 HX OF ADENOMATOUS COLONIC POLYPS: ICD-10-CM

## 2021-02-12 DIAGNOSIS — K22.70 BARRETT'S ESOPHAGUS WITHOUT DYSPLASIA: Chronic | ICD-10-CM

## 2021-02-12 DIAGNOSIS — K76.0 FATTY LIVER: Chronic | ICD-10-CM

## 2021-02-12 DIAGNOSIS — R14.0 BLOATING: Chronic | ICD-10-CM

## 2021-02-12 DIAGNOSIS — Z12.11 COLON CANCER SCREENING: ICD-10-CM

## 2021-02-12 PROCEDURE — 99443 PR PHYS/QHP TELEPHONE EVALUATION 21-30 MIN: CPT | Performed by: NURSE PRACTITIONER

## 2021-02-12 RX ORDER — FAMOTIDINE 40 MG/1
40 TABLET, FILM COATED ORAL 2 TIMES DAILY PRN
Qty: 60 TABLET | Refills: 5 | Status: SHIPPED | OUTPATIENT
Start: 2021-02-12 | End: 2022-06-30

## 2021-02-12 NOTE — PATIENT INSTRUCTIONS
1. For GERD and Raphael's esophagus, continue omeprazole 40 mg twice daily.    2. Additionally for GERD and intermittent epigastric pain, we will add famotidine 40 mg up to twice daily as needed.  New prescription has been sent to your pharmacy.    3.  You may continue to use Gas-X for intermittent bloating and we will also mail you a copy of the low FODMAP diet.  This diet will help you to identify specific foods that could be contributing to symptoms.    4.  You may continue to use sucralfate up to 4 times daily as needed for epigastric pain.    5.  We have scheduled a telephone follow-up visit in 8 weeks for reassessment of symptoms.  Should your symptoms worsen or fail to improve prior to this visit, please feel free to contact our office at [phone].

## 2021-02-12 NOTE — PROGRESS NOTES
Chief Complaint   Patient presents with   • Follow-up     GERD            History of Present Illness  70-year-old male presents today for telephone follow-up.  He was last seen via telephone visit on 11/13/2020.  Has history of GERD, Raphael's esophagus, Nissen fundoplication, bloating, fatty liver, and colon polyps.  Most recent EGD and colonoscopy performed on 10/19/2020 demonstrated mucosal changes consistent with short segment Raphael's as well as gastritis.  Esophagus biopsy demonstrated chronic inflammation and reactive foveolar hyperplasia.  He was noted to have a 4 mm tubular adenoma in the cecum and diverticulosis of the sigmoid colon, as well as an anal stricture on digital rectal exam.  He will be due for his next surveillance colonoscopy at a 5-year interval on 10/19/2025.    He continues omeprazole 40 mg twice daily for Raphael's esophagus and is currently due for his next surveillance EGD on 10/19/2023.  He also utilizes sucralfate as needed, generally only once per day in the morning hours if he experiences symptoms.  He continues to have infrequent episodes of severe epigastric pain has not been unable to relate these to any specific foods or times of day.  At times he can go 2 weeks with no symptoms and then can have symptoms a couple days in a row.  Prior to experiencing severe epigastric pain he will experience a flushed feeling and abdominal bloating.  He reports using Magic mouthwash and tramadol, both of which which are provided by his PCP office and symptoms subside.  He denies any nausea, vomiting, or dysphagia.  He reports occasional early satiety.  He takes an 81 mg coated aspirin daily but denies any use of any additional NSAIDs.    He reports persistent abdominal bloating that occurs on a regular basis.  He has been unable to relate this to any specific foods.  Gas-X does help somewhat.  He uses a CPAP machine at night, which she feels contributes to symptoms.  He reports an average of  1-3 normal stools per day and denies any melena or hematochezia.  His weight is stable.    He a history of fatty liver and had reported a weight loss of 18 pounds at his last office visit.  He continues daily milk thistle supplementation and is due for liver lab recheck March 2021.    You have chosen to receive care through a telephone visit.   Do you consent to use a telephone visit for your medical care today? Yes    Review of Systems   Constitutional: Negative.    HENT: Negative for trouble swallowing.    Cardiovascular: Negative for chest pain.   Gastrointestinal: Positive for abdominal pain. Negative for abdominal distention, anal bleeding, blood in stool, constipation, diarrhea, nausea, rectal pain and vomiting.      Result Review :      SCANNED - COLONOSCOPY (10/19/2020)  Tissue Pathology Exam (10/19/2020 13:05)  Urease For H Pylori - Tissue, Stomach (10/19/2020 12:48)  Office Visit with Fadia Ahn APRN (11/13/2020)    Physical Exam - TELEPHONE VISIT       Assessment and Plan      Diagnoses and all orders for this visit:    1. Gastroesophageal reflux disease, unspecified whether esophagitis present (Primary)    2. Raphael's esophagus without dysplasia    3. Fatty liver    4. Hx of adenomatous colonic polyps    5. Colon cancer screening    6. Bloating    Other orders  -     famotidine (PEPCID) 40 MG tablet; Take 1 tablet by mouth 2 (Two) Times a Day As Needed for Heartburn.  Dispense: 60 tablet; Refill: 5           This visit has been rescheduled as a phone visit to comply with patient safety concerns in accordance with CDC recommendations. Total time of discussion was 23 minutes.    Follow Up   Return in about 8 weeks (around 4/9/2021).     Patient Instructions   1. For GERD and Raphael's esophagus, continue omeprazole 40 mg twice daily.    2. Additionally for GERD and intermittent epigastric pain, we will add famotidine 40 mg up to twice daily as needed.  New prescription has been sent to your  pharmacy.    3.  You may continue to use Gas-X for intermittent bloating and we will also mail you a copy of the low FODMAP diet.  This diet will help you to identify specific foods that could be contributing to symptoms.    4.  You may continue to use sucralfate up to 4 times daily as needed for epigastric pain.    5.  We have scheduled a telephone follow-up visit in 8 weeks for reassessment of symptoms.  Should your symptoms worsen or fail to improve prior to this visit, please feel free to contact our office at [phone].     Discussion:    Patient to continue twice daily PPI therapy.  We will add famotidine 40 mg up to twice daily as needed for symptom management and patient can continue to use sucralfate as needed.    Abdominal bloating, patient was recommended to follow the low FODMAP diet and utilize Gas-X intermittently.  Discussion was held regarding utility of hydrogen breath test, as small bowel bacterial overgrowth could be a component of some of his symptoms.  At this time patient defers testing, but we will revisit in 8 weeks at his next follow-up visit.  Patient verbalized understand of above plan of care and is in agreement.  All questions answered and support provided.

## 2021-03-04 NOTE — TELEPHONE ENCOUNTER
"----- Message from Eliezer Kennedy sent at 3/4/2021 10:00 AM EST -----  Regarding: Non-Urgent Medical Question  Contact: [phone]  Yesterday visited my GP. During visit we discussed meds I'm currently taking. One of them the \"Magic Mouthwash\" I use during an esoph/GERD episode. Not sure where the presc originated but I've discussed it with Fadia. Reason I brought it up during the visit is I've received a notice my ins will no longer cover it due to a change in their \"formulary\" approvals. Spoke with [facility]  [facility], the current presc contains an over the counter drug making it uninsurable. Last two times I've had it refilled it appears to be a different formula than in the past. Don't know if that's where the insurance issue comes in. Bottom line the GP suggests Fadia request refills now. I use [facility] [place] [facility] [place]. Contact me if questions.  "

## 2021-03-09 DIAGNOSIS — Z23 IMMUNIZATION DUE: ICD-10-CM

## 2021-04-13 ENCOUNTER — OFFICE VISIT (OUTPATIENT)
Dept: GASTROENTEROLOGY | Facility: CLINIC | Age: 71
End: 2021-04-13

## 2021-04-13 VITALS — WEIGHT: 243 LBS | BODY MASS INDEX: 34.02 KG/M2 | HEIGHT: 71 IN

## 2021-04-13 DIAGNOSIS — Z12.11 COLON CANCER SCREENING: ICD-10-CM

## 2021-04-13 DIAGNOSIS — K76.0 FATTY LIVER: Chronic | ICD-10-CM

## 2021-04-13 DIAGNOSIS — K21.9 GASTROESOPHAGEAL REFLUX DISEASE, UNSPECIFIED WHETHER ESOPHAGITIS PRESENT: Primary | Chronic | ICD-10-CM

## 2021-04-13 DIAGNOSIS — K59.00 CONSTIPATION, UNSPECIFIED CONSTIPATION TYPE: ICD-10-CM

## 2021-04-13 DIAGNOSIS — Z86.010 HX OF ADENOMATOUS COLONIC POLYPS: ICD-10-CM

## 2021-04-13 PROCEDURE — 99442 PR PHYS/QHP TELEPHONE EVALUATION 11-20 MIN: CPT | Performed by: NURSE PRACTITIONER

## 2021-04-13 RX ORDER — ACETAMINOPHEN 500 MG
TABLET ORAL
COMMUNITY
Start: 2021-03-22

## 2021-04-13 RX ORDER — POLYETHYLENE GLYCOL 3350 17 G/17G
17 POWDER, FOR SOLUTION ORAL 2 TIMES DAILY
Qty: 60 PACKET | Refills: 11 | Status: SHIPPED | OUTPATIENT
Start: 2021-04-13 | End: 2022-06-30

## 2021-04-13 NOTE — PATIENT INSTRUCTIONS
1.  For GERD, continue omeprazole 40 mg twice daily and famotidine 40 mg up to twice daily as needed.  For GERD, we recommend avoiding eating 3-4 hours before bedtime, eating smaller more frequent meals, and avoiding any known food triggers including spicy foods, tomatoes and tomato-based sauces, chocolate, coffee/tea, citrus fruits, carbonated  beverages and alcohol.     2.  For new onset of constipation we recommended she continue Metamucil daily fiber supplement and add MiraLAX 1 capful 1-2 times daily.  You may titrate your dosing based upon your response.  This prescription has been sent to your pharmacy.    3.  Continue efforts toward weight loss.  Great job with your weight loss of 50 pounds.  Continue daily supplementation with milk thistle.  We will plan to recheck hepatic function panel in September for ongoing monitoring of fatty liver.    4.  For colon cancer screening, your next surveillance colonoscopy will be due October 2025 due to your history of colon polyps.

## 2021-04-13 NOTE — PROGRESS NOTES
Chief Complaint   Patient presents with   • Follow-up     GERD, fatty liver, constipation         History of Present Illness  70-year-old male presents today for telephone follow-up.  He was last seen via telephone visit on 2/12/2021.  He has a history of GERD, Raphael's esophagus, Nissen fundoplication, fatty liver, bloating, and colon polyps.EGD and colonoscopy on 10/19/2020 revealed mucosal changes consistent with short segment Raphael's esophagus and gastritis.  You have chosen to receive care through a telephone visit.  He was noted to have one 4 mm tubular adenoma in the cecum and diverticulosis of the sigmoid colon.  He was also noted to have an anal stricture on digital rectal exam.  He was recommended to undergo his next surveillance colonoscopy on 10/19/2025.    For GERD and Raphael's esophagus, he continues omeprazole 40 mg twice daily and famotidine 40 mg up to twice daily.  On most days he utilizes Pepcid once daily, but occasionally will take a second dose in the middle of the night.  This regimen seems to work much better in managing his heartburn and reflux symptoms.  He is not utilizing sucralfate at this time.  He denies any nausea, vomiting, or dysphagia..    Since his last office visit, he reports an onset of constipation.  Bowel movements occur approximately every 3 days.  He does report the passing of gas on days he does not have a BM.  He reports some borborygmi.  He reports straining with most BMs. He has tried Dulcolax, but states he still goes 2 days between bowel movements.  He denies any melena or hematochezia.  He continues Metamucil supplement daily.  He has not yet tried MiraLAX.    He has a history of fatty liver and continues with weight loss.  He has lost a total of 50 pounds within the past 9 to 12 months.  He takes milk thistle daily.  He is due for his hepatic function panel September 2021.    Do you consent to use a telephone visit for your medical care today? Yes    Review of  Systems   Constitutional: Negative for unexpected weight change.   HENT: Negative for trouble swallowing.    Cardiovascular: Negative for chest pain.   Gastrointestinal: Positive for constipation. Negative for abdominal distention, abdominal pain, anal bleeding, diarrhea, nausea, rectal pain and vomiting.      Result Review :      SCANNED - COLONOSCOPY (10/19/2020)  Tissue Pathology Exam (10/19/2020 13:05)  Urease For H Pylori - Tissue, Stomach (10/19/2020 12:48)  Office Visit with Fadia Ahn APRN (02/12/2021)    Assessment and Plan    Diagnoses and all orders for this visit:    1. Gastroesophageal reflux disease, unspecified whether esophagitis present (Primary)    2. Constipation, unspecified constipation type  Comments:  New and undiagnosed problem    3. Fatty liver    4. Hx of adenomatous colonic polyps    5. Colon cancer screening    Other orders  -     polyethylene glycol (MIRALAX) 17 g packet; Take 17 g by mouth 2 (Two) Times a Day.  Dispense: 60 packet; Refill: 11             This visit has been rescheduled as a phone visit to comply with patient safety concerns in accordance with CDC recommendations. Total time of discussion was 12 minutes.    Follow Up   Return in about 3 months (around 7/13/2021).    Patient Instructions   1.  For GERD, continue omeprazole 40 mg twice daily and famotidine 40 mg up to twice daily as needed.  For GERD, we recommend avoiding eating 3-4 hours before bedtime, eating smaller more frequent meals, and avoiding any known food triggers including spicy foods, tomatoes and tomato-based sauces, chocolate, coffee/tea, citrus fruits, carbonated  beverages and alcohol.     2.  For new onset of constipation we recommended she continue Metamucil daily fiber supplement and add MiraLAX 1 capful 1-2 times daily.  You may titrate your dosing based upon your response.  This prescription has been sent to your pharmacy.    3.  Continue efforts toward weight loss.  Great job with your  weight loss of 50 pounds.  Continue daily supplementation with milk thistle.  We will plan to recheck hepatic function panel in September for ongoing monitoring of fatty liver.    4.  For colon cancer screening, your next surveillance colonoscopy will be due October 2025 due to your history of colon polyps.     Discussion:    Patient's current regimen is working well for management of GERD which we will continue with omeprazole 40 mg twice daily and famotidine 40 mg up to twice daily.  Patient is also been recommended to implement antireflux precautions.    For new onset of constipation, we have sent in a prescription for MiraLAX 1 packet up to twice daily as needed for symptom management and have encouraged patient to titrate his dosing accordingly to help produce regular bowel movements.  Patient was also instructed to continue his Metamucil supplement.  Next colonoscopy will be due October 2025 due to his history of colon polyps.    For fatty liver, patient has made excellent progress toward weight loss and is lost a total of 50 pounds.  He will be due for his next [place] function panel September 2021.  We will plan for telephone follow-up in 3 months for reassessment of constipation symptoms, or sooner should symptoms worsen or fail to improve.  Could consider use of either Trulance, Amitiza, or Linzess in the future based upon his insurance preference if MiraLAX proves to be ineffective.  Patient verbalized understand of above plan of care and is in agreement.  All questions answered and support provided.

## 2021-06-29 ENCOUNTER — TELEPHONE (OUTPATIENT)
Dept: GASTROENTEROLOGY | Facility: CLINIC | Age: 71
End: 2021-06-29

## 2021-06-29 NOTE — TELEPHONE ENCOUNTER
Patient is requesting a refill for the Radhika's Magic Mouth Wash.  Ok to refill?  Refilled rx via phone call with two refills. pk

## 2021-11-16 ENCOUNTER — TELEPHONE (OUTPATIENT)
Dept: GASTROENTEROLOGY | Facility: CLINIC | Age: 71
End: 2021-11-16

## 2021-11-16 NOTE — TELEPHONE ENCOUNTER
"RX compound for Radhika's Magic Mixture always defaults to \"print\" in Epic and does not transmit.  I called this into [employer] today.  Patient advised.  "

## 2022-03-10 ENCOUNTER — PATIENT MESSAGE (OUTPATIENT)
Dept: GASTROENTEROLOGY | Facility: CLINIC | Age: 72
End: 2022-03-10

## 2022-03-14 NOTE — TELEPHONE ENCOUNTER
This patient states that his prescription is no longer in the system at [facility] and needs to have it resent please.  Trying to get it filed with insurance and denied to obtain prior auth for a year.  He would also like to have a couple of refills if possible. Pharmacy in chart is correct.

## 2022-05-20 ENCOUNTER — TELEPHONE (OUTPATIENT)
Dept: GASTROENTEROLOGY | Facility: CLINIC | Age: 72
End: 2022-05-20

## 2022-05-20 NOTE — TELEPHONE ENCOUNTER
Patient advises he no longer takes sucralfate because he broke out in hives when taken. Patient underwent coronary angioplasty x3 recently and is scheduled for a fourth next month.

## 2022-05-25 ENCOUNTER — TELEPHONE (OUTPATIENT)
Dept: GASTROENTEROLOGY | Facility: CLINIC | Age: 72
End: 2022-05-25

## 2022-05-25 PROBLEM — K21.9 GASTROESOPHAGEAL REFLUX DISEASE WITHOUT ESOPHAGITIS: Status: ACTIVE | Noted: 2022-05-25

## 2022-06-30 ENCOUNTER — LAB (OUTPATIENT)
Dept: LAB | Facility: HOSPITAL | Age: 72
End: 2022-06-30

## 2022-06-30 ENCOUNTER — OFFICE VISIT (OUTPATIENT)
Dept: GASTROENTEROLOGY | Facility: CLINIC | Age: 72
End: 2022-06-30

## 2022-06-30 VITALS
OXYGEN SATURATION: 99 % | HEART RATE: 67 BPM | BODY MASS INDEX: 32.13 KG/M2 | TEMPERATURE: 97.5 F | SYSTOLIC BLOOD PRESSURE: 152 MMHG | WEIGHT: 229.5 LBS | HEIGHT: 71 IN | DIASTOLIC BLOOD PRESSURE: 78 MMHG

## 2022-06-30 DIAGNOSIS — Z86.010 HX OF ADENOMATOUS COLONIC POLYPS: ICD-10-CM

## 2022-06-30 DIAGNOSIS — K59.00 CONSTIPATION, UNSPECIFIED CONSTIPATION TYPE: Chronic | ICD-10-CM

## 2022-06-30 DIAGNOSIS — K76.0 FATTY LIVER: Chronic | ICD-10-CM

## 2022-06-30 DIAGNOSIS — K21.9 GASTROESOPHAGEAL REFLUX DISEASE, UNSPECIFIED WHETHER ESOPHAGITIS PRESENT: Primary | Chronic | ICD-10-CM

## 2022-06-30 DIAGNOSIS — K22.70 BARRETT'S ESOPHAGUS WITHOUT DYSPLASIA: Chronic | ICD-10-CM

## 2022-06-30 DIAGNOSIS — Z12.11 COLON CANCER SCREENING: ICD-10-CM

## 2022-06-30 DIAGNOSIS — R10.13 EPIGASTRIC PAIN: ICD-10-CM

## 2022-06-30 LAB
ALBUMIN SERPL-MCNC: 4.5 G/DL (ref 3.5–5.2)
ALP SERPL-CCNC: 48 U/L (ref 39–117)
ALT SERPL W P-5'-P-CCNC: 26 U/L (ref 1–41)
AST SERPL-CCNC: 22 U/L (ref 1–40)
BILIRUB CONJ SERPL-MCNC: 0.2 MG/DL (ref 0–0.3)
BILIRUB INDIRECT SERPL-MCNC: 0.6 MG/DL
BILIRUB SERPL-MCNC: 0.8 MG/DL (ref 0–1.2)
PROT SERPL-MCNC: 7.5 G/DL (ref 6–8.5)

## 2022-06-30 PROCEDURE — 80076 HEPATIC FUNCTION PANEL: CPT | Performed by: NURSE PRACTITIONER

## 2022-06-30 PROCEDURE — 36415 COLL VENOUS BLD VENIPUNCTURE: CPT | Performed by: NURSE PRACTITIONER

## 2022-06-30 PROCEDURE — 99214 OFFICE O/P EST MOD 30 MIN: CPT | Performed by: NURSE PRACTITIONER

## 2022-06-30 RX ORDER — AMLODIPINE BESYLATE 5 MG/1
5 TABLET ORAL DAILY
COMMUNITY

## 2022-06-30 RX ORDER — HYDROCHLOROTHIAZIDE 12.5 MG/1
12.5 CAPSULE, GELATIN COATED ORAL DAILY
COMMUNITY
Start: 2022-05-19

## 2022-06-30 RX ORDER — LANSOPRAZOLE 30 MG/1
CAPSULE, DELAYED RELEASE ORAL
Qty: 180 CAPSULE | Refills: 3 | Status: SHIPPED | OUTPATIENT
Start: 2022-06-30

## 2022-07-15 ENCOUNTER — TELEPHONE (OUTPATIENT)
Dept: GASTROENTEROLOGY | Facility: CLINIC | Age: 72
End: 2022-07-15

## 2022-07-15 ENCOUNTER — PREP FOR SURGERY (OUTPATIENT)
Dept: SURGERY | Facility: SURGERY CENTER | Age: 72
End: 2022-07-15

## 2022-07-15 DIAGNOSIS — K22.70 BARRETT'S ESOPHAGUS WITHOUT DYSPLASIA: ICD-10-CM

## 2022-07-15 DIAGNOSIS — K92.0 HEMATEMESIS, UNSPECIFIED WHETHER NAUSEA PRESENT: Primary | ICD-10-CM

## 2022-07-15 DIAGNOSIS — K21.9 GASTROESOPHAGEAL REFLUX DISEASE, UNSPECIFIED WHETHER ESOPHAGITIS PRESENT: ICD-10-CM

## 2022-07-15 RX ORDER — SODIUM CHLORIDE, SODIUM LACTATE, POTASSIUM CHLORIDE, CALCIUM CHLORIDE 600; 310; 30; 20 MG/100ML; MG/100ML; MG/100ML; MG/100ML
30 INJECTION, SOLUTION INTRAVENOUS CONTINUOUS PRN
Status: CANCELLED | OUTPATIENT
Start: 2022-07-15

## 2022-07-15 RX ORDER — SODIUM CHLORIDE 0.9 % (FLUSH) 0.9 %
3 SYRINGE (ML) INJECTION EVERY 12 HOURS SCHEDULED
Status: CANCELLED | OUTPATIENT
Start: 2022-07-15

## 2022-07-15 RX ORDER — SODIUM CHLORIDE 0.9 % (FLUSH) 0.9 %
10 SYRINGE (ML) INJECTION AS NEEDED
Status: CANCELLED | OUTPATIENT
Start: 2022-07-15

## 2022-07-15 NOTE — TELEPHONE ENCOUNTER
Please schedule patient for EGD.  See note below.  He is on a blood thinner for recent stent placement.

## 2022-07-15 NOTE — TELEPHONE ENCOUNTER
Patient called and states he coughed up old blood today.  Has been having trouble with reflux lately but is feeling better now. Wanted to inform office.  Wonders what he should do?

## 2022-07-20 ENCOUNTER — TELEPHONE (OUTPATIENT)
Dept: GASTROENTEROLOGY | Facility: CLINIC | Age: 72
End: 2022-07-20

## 2022-07-20 DIAGNOSIS — K92.0 HEMATEMESIS, UNSPECIFIED WHETHER NAUSEA PRESENT: ICD-10-CM

## 2022-07-20 DIAGNOSIS — D64.9 ANEMIA, UNSPECIFIED TYPE: Primary | ICD-10-CM

## 2022-07-20 NOTE — TELEPHONE ENCOUNTER
Left message with Elfego to call and schedule. Pt is on blood thinner and recent stent placement. Not sure if we will be able to schedule

## 2022-07-20 NOTE — TELEPHONE ENCOUNTER
----- Message from Eliezer Kennedy sent at 7/20/2022  9:35 AM EDT -----  Regarding: Referral for EGD   It’s 9:30 Wednesday morning and I still haven’t heard back from your office or a scheduling department. What do I do now?

## 2022-07-28 LAB
BASOPHILS # BLD AUTO: 0 X10E3/UL (ref 0–0.2)
BASOPHILS NFR BLD AUTO: 0 %
EOSINOPHIL # BLD AUTO: 0.1 X10E3/UL (ref 0–0.4)
EOSINOPHIL NFR BLD AUTO: 1 %
ERYTHROCYTE [DISTWIDTH] IN BLOOD BY AUTOMATED COUNT: 12.9 % (ref 11.6–15.4)
HCT VFR BLD AUTO: 36.4 % (ref 37.5–51)
HGB BLD-MCNC: 13 G/DL (ref 13–17.7)
IMM GRANULOCYTES # BLD AUTO: 0 X10E3/UL (ref 0–0.1)
IMM GRANULOCYTES NFR BLD AUTO: 0 %
LYMPHOCYTES # BLD AUTO: 2 X10E3/UL (ref 0.7–3.1)
LYMPHOCYTES NFR BLD AUTO: 25 %
MCH RBC QN AUTO: 34.4 PG (ref 26.6–33)
MCHC RBC AUTO-ENTMCNC: 35.7 G/DL (ref 31.5–35.7)
MCV RBC AUTO: 96 FL (ref 79–97)
MONOCYTES # BLD AUTO: 0.4 X10E3/UL (ref 0.1–0.9)
MONOCYTES NFR BLD AUTO: 5 %
NEUTROPHILS # BLD AUTO: 5.4 X10E3/UL (ref 1.4–7)
NEUTROPHILS NFR BLD AUTO: 69 %
PLATELET # BLD AUTO: 240 X10E3/UL (ref 150–450)
RBC # BLD AUTO: 3.78 X10E6/UL (ref 4.14–5.8)
WBC # BLD AUTO: 7.9 X10E3/UL (ref 3.4–10.8)

## 2022-08-07 ENCOUNTER — INPATIENT HOSPITAL (AMBULATORY)
Dept: URBAN - METROPOLITAN AREA HOSPITAL 107 | Facility: HOSPITAL | Age: 72
End: 2022-08-07

## 2022-08-07 DIAGNOSIS — K92.0 HEMATEMESIS: ICD-10-CM

## 2022-08-07 DIAGNOSIS — R74.8 ABNORMAL LEVELS OF OTHER SERUM ENZYMES: ICD-10-CM

## 2022-08-07 DIAGNOSIS — R10.13 EPIGASTRIC PAIN: ICD-10-CM

## 2022-08-07 PROCEDURE — 99223 1ST HOSP IP/OBS HIGH 75: CPT | Performed by: INTERNAL MEDICINE

## 2022-08-08 ENCOUNTER — INPATIENT HOSPITAL (AMBULATORY)
Dept: URBAN - METROPOLITAN AREA HOSPITAL 107 | Facility: HOSPITAL | Age: 72
End: 2022-08-08

## 2022-08-08 DIAGNOSIS — K22.2 ESOPHAGEAL OBSTRUCTION: ICD-10-CM

## 2022-08-08 DIAGNOSIS — R12 HEARTBURN: ICD-10-CM

## 2022-08-08 DIAGNOSIS — K92.0 HEMATEMESIS: ICD-10-CM

## 2022-08-08 PROCEDURE — 43235 EGD DIAGNOSTIC BRUSH WASH: CPT | Performed by: INTERNAL MEDICINE

## 2022-09-27 ENCOUNTER — OFFICE (AMBULATORY)
Dept: URBAN - METROPOLITAN AREA CLINIC 75 | Facility: CLINIC | Age: 72
End: 2022-09-27
Payer: COMMERCIAL

## 2022-09-27 VITALS
SYSTOLIC BLOOD PRESSURE: 120 MMHG | HEIGHT: 71 IN | OXYGEN SATURATION: 100 % | DIASTOLIC BLOOD PRESSURE: 76 MMHG | HEART RATE: 60 BPM | WEIGHT: 240 LBS

## 2022-09-27 DIAGNOSIS — R11.0 NAUSEA: ICD-10-CM

## 2022-09-27 DIAGNOSIS — K21.9 GASTRO-ESOPHAGEAL REFLUX DISEASE WITHOUT ESOPHAGITIS: ICD-10-CM

## 2022-09-27 PROCEDURE — 99214 OFFICE O/P EST MOD 30 MIN: CPT | Performed by: INTERNAL MEDICINE

## 2023-03-28 ENCOUNTER — OFFICE (AMBULATORY)
Dept: URBAN - METROPOLITAN AREA CLINIC 76 | Facility: CLINIC | Age: 73
End: 2023-03-28

## 2023-03-28 VITALS
HEIGHT: 71 IN | HEART RATE: 65 BPM | OXYGEN SATURATION: 98 % | DIASTOLIC BLOOD PRESSURE: 81 MMHG | SYSTOLIC BLOOD PRESSURE: 134 MMHG | WEIGHT: 243 LBS

## 2023-03-28 DIAGNOSIS — K21.9 GASTRO-ESOPHAGEAL REFLUX DISEASE WITHOUT ESOPHAGITIS: ICD-10-CM

## 2023-03-28 DIAGNOSIS — R13.10 DYSPHAGIA, UNSPECIFIED: ICD-10-CM

## 2023-03-28 PROCEDURE — 99213 OFFICE O/P EST LOW 20 MIN: CPT | Performed by: INTERNAL MEDICINE

## 2023-07-10 ENCOUNTER — OFFICE (AMBULATORY)
Dept: URBAN - METROPOLITAN AREA CLINIC 76 | Facility: CLINIC | Age: 73
End: 2023-07-10

## 2023-07-10 VITALS
SYSTOLIC BLOOD PRESSURE: 124 MMHG | HEIGHT: 71 IN | WEIGHT: 247 LBS | OXYGEN SATURATION: 97 % | HEART RATE: 68 BPM | DIASTOLIC BLOOD PRESSURE: 76 MMHG

## 2023-07-10 DIAGNOSIS — R13.10 DYSPHAGIA, UNSPECIFIED: ICD-10-CM

## 2023-07-10 DIAGNOSIS — K21.9 GASTRO-ESOPHAGEAL REFLUX DISEASE WITHOUT ESOPHAGITIS: ICD-10-CM

## 2023-07-10 PROCEDURE — 99214 OFFICE O/P EST MOD 30 MIN: CPT

## 2023-08-04 VITALS
RESPIRATION RATE: 16 BRPM | OXYGEN SATURATION: 97 % | SYSTOLIC BLOOD PRESSURE: 131 MMHG | RESPIRATION RATE: 15 BRPM | SYSTOLIC BLOOD PRESSURE: 138 MMHG | OXYGEN SATURATION: 95 % | HEART RATE: 62 BPM | RESPIRATION RATE: 17 BRPM | HEART RATE: 57 BPM | HEART RATE: 53 BPM | RESPIRATION RATE: 14 BRPM | SYSTOLIC BLOOD PRESSURE: 133 MMHG | TEMPERATURE: 97.8 F | SYSTOLIC BLOOD PRESSURE: 152 MMHG | DIASTOLIC BLOOD PRESSURE: 68 MMHG | SYSTOLIC BLOOD PRESSURE: 113 MMHG | HEIGHT: 71 IN | HEART RATE: 64 BPM | DIASTOLIC BLOOD PRESSURE: 64 MMHG | SYSTOLIC BLOOD PRESSURE: 160 MMHG | OXYGEN SATURATION: 99 % | DIASTOLIC BLOOD PRESSURE: 65 MMHG | WEIGHT: 247 LBS | TEMPERATURE: 97.3 F | DIASTOLIC BLOOD PRESSURE: 78 MMHG | DIASTOLIC BLOOD PRESSURE: 81 MMHG | OXYGEN SATURATION: 100 % | HEART RATE: 59 BPM | SYSTOLIC BLOOD PRESSURE: 169 MMHG | DIASTOLIC BLOOD PRESSURE: 74 MMHG | HEART RATE: 54 BPM | DIASTOLIC BLOOD PRESSURE: 62 MMHG | OXYGEN SATURATION: 96 % | RESPIRATION RATE: 19 BRPM | RESPIRATION RATE: 11 BRPM | SYSTOLIC BLOOD PRESSURE: 151 MMHG | HEART RATE: 52 BPM | DIASTOLIC BLOOD PRESSURE: 91 MMHG

## 2023-08-09 ENCOUNTER — AMBULATORY SURGICAL CENTER (AMBULATORY)
Dept: URBAN - METROPOLITAN AREA SURGERY 17 | Facility: SURGERY | Age: 73
End: 2023-08-09

## 2023-08-09 ENCOUNTER — OFFICE (AMBULATORY)
Dept: URBAN - METROPOLITAN AREA PATHOLOGY 4 | Facility: PATHOLOGY | Age: 73
End: 2023-08-09

## 2023-08-09 DIAGNOSIS — K31.7 POLYP OF STOMACH AND DUODENUM: ICD-10-CM

## 2023-08-09 DIAGNOSIS — R13.10 DYSPHAGIA, UNSPECIFIED: ICD-10-CM

## 2023-08-09 DIAGNOSIS — K22.70 BARRETT'S ESOPHAGUS WITHOUT DYSPLASIA: ICD-10-CM

## 2023-08-09 DIAGNOSIS — K21.9 GASTRO-ESOPHAGEAL REFLUX DISEASE WITHOUT ESOPHAGITIS: ICD-10-CM

## 2023-08-09 DIAGNOSIS — K21.00 GASTRO-ESOPHAGEAL REFLUX DISEASE WITH ESOPHAGITIS, WITHOUT B: ICD-10-CM

## 2023-08-09 DIAGNOSIS — Z98.890 OTHER SPECIFIED POSTPROCEDURAL STATES: ICD-10-CM

## 2023-08-09 DIAGNOSIS — K29.30 CHRONIC SUPERFICIAL GASTRITIS WITHOUT BLEEDING: ICD-10-CM

## 2023-08-09 DIAGNOSIS — K29.50 UNSPECIFIED CHRONIC GASTRITIS WITHOUT BLEEDING: ICD-10-CM

## 2023-08-09 PROBLEM — K31.89 OTHER DISEASES OF STOMACH AND DUODENUM: Status: ACTIVE | Noted: 2023-08-09

## 2023-08-09 PROBLEM — K22.89 OTHER SPECIFIED DISEASE OF ESOPHAGUS: Status: ACTIVE | Noted: 2023-08-09

## 2023-08-09 LAB
GI HISTOLOGY: A. SELECT: (no result)
GI HISTOLOGY: B. UNSPECIFIED: (no result)
GI HISTOLOGY: C. UNSPECIFIED: (no result)
GI HISTOLOGY: D. UNSPECIFIED: (no result)
GI HISTOLOGY: E. UNSPECIFIED: (no result)
GI HISTOLOGY: F. UNSPECIFIED: (no result)
GI HISTOLOGY: PDF REPORT: (no result)

## 2023-08-09 PROCEDURE — 43239 EGD BIOPSY SINGLE/MULTIPLE: CPT | Performed by: INTERNAL MEDICINE

## 2023-08-09 PROCEDURE — 88342 IMHCHEM/IMCYTCHM 1ST ANTB: CPT | Performed by: INTERNAL MEDICINE

## 2023-08-09 PROCEDURE — 88305 TISSUE EXAM BY PATHOLOGIST: CPT | Performed by: INTERNAL MEDICINE

## 2023-10-10 ENCOUNTER — OFFICE (AMBULATORY)
Dept: URBAN - METROPOLITAN AREA CLINIC 76 | Facility: CLINIC | Age: 73
End: 2023-10-10

## 2023-10-10 VITALS
HEART RATE: 74 BPM | OXYGEN SATURATION: 99 % | SYSTOLIC BLOOD PRESSURE: 145 MMHG | HEIGHT: 71 IN | DIASTOLIC BLOOD PRESSURE: 81 MMHG | WEIGHT: 250 LBS

## 2023-10-10 DIAGNOSIS — K22.70 BARRETT'S ESOPHAGUS WITHOUT DYSPLASIA: ICD-10-CM

## 2023-10-10 DIAGNOSIS — K21.9 GASTRO-ESOPHAGEAL REFLUX DISEASE WITHOUT ESOPHAGITIS: ICD-10-CM

## 2023-10-10 DIAGNOSIS — R13.19 OTHER DYSPHAGIA: ICD-10-CM

## 2023-10-10 PROCEDURE — 99214 OFFICE O/P EST MOD 30 MIN: CPT

## 2023-10-10 RX ORDER — PANTOPRAZOLE SODIUM 40 MG/1
80 TABLET, DELAYED RELEASE ORAL
Qty: 180 | Refills: 3 | Status: ACTIVE

## 2024-01-05 ENCOUNTER — OFFICE (AMBULATORY)
Dept: URBAN - METROPOLITAN AREA CLINIC 76 | Facility: CLINIC | Age: 74
End: 2024-01-05
Payer: COMMERCIAL

## 2024-01-05 VITALS
SYSTOLIC BLOOD PRESSURE: 125 MMHG | HEART RATE: 84 BPM | DIASTOLIC BLOOD PRESSURE: 76 MMHG | HEIGHT: 71 IN | WEIGHT: 257 LBS | OXYGEN SATURATION: 96 %

## 2024-01-05 DIAGNOSIS — K22.4 DYSKINESIA OF ESOPHAGUS: ICD-10-CM

## 2024-01-05 DIAGNOSIS — K21.9 GASTRO-ESOPHAGEAL REFLUX DISEASE WITHOUT ESOPHAGITIS: ICD-10-CM

## 2024-01-05 PROCEDURE — 99214 OFFICE O/P EST MOD 30 MIN: CPT

## 2024-01-05 RX ORDER — HYOSCYAMINE SULFATE 0.12 MG/1
TABLET ORAL
Qty: 60 | Refills: 3 | Status: COMPLETED
Start: 2024-01-05 | End: 2024-05-07

## 2024-02-05 ENCOUNTER — OFFICE (AMBULATORY)
Dept: URBAN - METROPOLITAN AREA CLINIC 76 | Facility: CLINIC | Age: 74
End: 2024-02-05

## 2024-02-05 VITALS
SYSTOLIC BLOOD PRESSURE: 133 MMHG | DIASTOLIC BLOOD PRESSURE: 72 MMHG | HEART RATE: 72 BPM | WEIGHT: 252 LBS | HEIGHT: 71 IN | OXYGEN SATURATION: 99 %

## 2024-02-05 DIAGNOSIS — R10.9 UNSPECIFIED ABDOMINAL PAIN: ICD-10-CM

## 2024-02-05 DIAGNOSIS — K21.9 GASTRO-ESOPHAGEAL REFLUX DISEASE WITHOUT ESOPHAGITIS: ICD-10-CM

## 2024-02-05 PROBLEM — Z48.815 ENCOUNTER FOR SURGICAL AFTERCARE FOLLOWING SURGERY ON THE DI: Status: ACTIVE | Noted: 2023-08-09

## 2024-02-05 PROCEDURE — 99213 OFFICE O/P EST LOW 20 MIN: CPT

## 2024-03-01 ENCOUNTER — OFFICE (AMBULATORY)
Dept: URBAN - METROPOLITAN AREA CLINIC 76 | Facility: CLINIC | Age: 74
End: 2024-03-01
Payer: COMMERCIAL

## 2024-03-01 VITALS — WEIGHT: 254 LBS | HEIGHT: 71 IN

## 2024-03-01 DIAGNOSIS — K21.9 GASTRO-ESOPHAGEAL REFLUX DISEASE WITHOUT ESOPHAGITIS: ICD-10-CM

## 2024-03-01 DIAGNOSIS — R13.10 DYSPHAGIA, UNSPECIFIED: ICD-10-CM

## 2024-03-01 DIAGNOSIS — K22.70 BARRETT'S ESOPHAGUS WITHOUT DYSPLASIA: ICD-10-CM

## 2024-03-01 PROCEDURE — 99214 OFFICE O/P EST MOD 30 MIN: CPT | Performed by: INTERNAL MEDICINE

## 2024-03-01 RX ORDER — AMITRIPTYLINE HYDROCHLORIDE 10 MG/1
10 TABLET, FILM COATED ORAL
Qty: 30 | Refills: 3 | Status: COMPLETED
Start: 2024-03-01 | End: 2024-05-07

## 2024-05-06 VITALS
TEMPERATURE: 98.2 F | WEIGHT: 238 LBS | DIASTOLIC BLOOD PRESSURE: 73 MMHG | OXYGEN SATURATION: 99 % | DIASTOLIC BLOOD PRESSURE: 62 MMHG | HEART RATE: 61 BPM | OXYGEN SATURATION: 98 % | SYSTOLIC BLOOD PRESSURE: 144 MMHG | DIASTOLIC BLOOD PRESSURE: 93 MMHG | HEART RATE: 56 BPM | RESPIRATION RATE: 12 BRPM | HEART RATE: 59 BPM | SYSTOLIC BLOOD PRESSURE: 152 MMHG | RESPIRATION RATE: 15 BRPM | HEART RATE: 62 BPM | HEIGHT: 71 IN | RESPIRATION RATE: 16 BRPM | SYSTOLIC BLOOD PRESSURE: 112 MMHG | DIASTOLIC BLOOD PRESSURE: 95 MMHG | HEART RATE: 66 BPM | SYSTOLIC BLOOD PRESSURE: 145 MMHG | HEART RATE: 57 BPM | RESPIRATION RATE: 10 BRPM | SYSTOLIC BLOOD PRESSURE: 140 MMHG | SYSTOLIC BLOOD PRESSURE: 162 MMHG | HEART RATE: 60 BPM | SYSTOLIC BLOOD PRESSURE: 137 MMHG | SYSTOLIC BLOOD PRESSURE: 153 MMHG | SYSTOLIC BLOOD PRESSURE: 111 MMHG | RESPIRATION RATE: 14 BRPM | OXYGEN SATURATION: 100 % | DIASTOLIC BLOOD PRESSURE: 94 MMHG | OXYGEN SATURATION: 97 % | DIASTOLIC BLOOD PRESSURE: 67 MMHG | DIASTOLIC BLOOD PRESSURE: 96 MMHG | DIASTOLIC BLOOD PRESSURE: 90 MMHG | DIASTOLIC BLOOD PRESSURE: 86 MMHG | TEMPERATURE: 98 F

## 2024-05-07 ENCOUNTER — AMBULATORY SURGICAL CENTER (AMBULATORY)
Dept: URBAN - METROPOLITAN AREA SURGERY 17 | Facility: SURGERY | Age: 74
End: 2024-05-07
Payer: COMMERCIAL

## 2024-05-07 ENCOUNTER — OFFICE (AMBULATORY)
Dept: URBAN - METROPOLITAN AREA PATHOLOGY 4 | Facility: PATHOLOGY | Age: 74
End: 2024-05-07
Payer: COMMERCIAL

## 2024-05-07 DIAGNOSIS — R13.19 OTHER DYSPHAGIA: ICD-10-CM

## 2024-05-07 DIAGNOSIS — K21.9 GASTRO-ESOPHAGEAL REFLUX DISEASE WITHOUT ESOPHAGITIS: ICD-10-CM

## 2024-05-07 DIAGNOSIS — K22.4 DYSKINESIA OF ESOPHAGUS: ICD-10-CM

## 2024-05-07 DIAGNOSIS — Z48.815 ENCOUNTER FOR SURGICAL AFTERCARE FOLLOWING SURGERY ON THE DI: ICD-10-CM

## 2024-05-07 DIAGNOSIS — K31.89 OTHER DISEASES OF STOMACH AND DUODENUM: ICD-10-CM

## 2024-05-07 LAB
GI HISTOLOGY: A. GASTRO-ESOPHAGEAL JUNCTION: (no result)
GI HISTOLOGY: PDF REPORT: (no result)

## 2024-05-07 PROCEDURE — 43249 ESOPH EGD DILATION <30 MM: CPT | Performed by: INTERNAL MEDICINE

## 2024-05-07 PROCEDURE — 43239 EGD BIOPSY SINGLE/MULTIPLE: CPT | Mod: 59 | Performed by: INTERNAL MEDICINE

## 2024-05-07 PROCEDURE — 88305 TISSUE EXAM BY PATHOLOGIST: CPT | Performed by: PATHOLOGY

## 2024-06-11 ENCOUNTER — OFFICE (AMBULATORY)
Dept: URBAN - METROPOLITAN AREA CLINIC 76 | Facility: CLINIC | Age: 74
End: 2024-06-11
Payer: COMMERCIAL

## 2024-06-11 VITALS
DIASTOLIC BLOOD PRESSURE: 62 MMHG | SYSTOLIC BLOOD PRESSURE: 121 MMHG | HEART RATE: 70 BPM | HEIGHT: 71 IN | WEIGHT: 245 LBS

## 2024-06-11 DIAGNOSIS — K59.00 CONSTIPATION, UNSPECIFIED: ICD-10-CM

## 2024-06-11 DIAGNOSIS — K22.70 BARRETT'S ESOPHAGUS WITHOUT DYSPLASIA: ICD-10-CM

## 2024-06-11 DIAGNOSIS — K21.9 GASTRO-ESOPHAGEAL REFLUX DISEASE WITHOUT ESOPHAGITIS: ICD-10-CM

## 2024-06-11 DIAGNOSIS — R13.10 DYSPHAGIA, UNSPECIFIED: ICD-10-CM

## 2024-06-11 DIAGNOSIS — K22.4 DYSKINESIA OF ESOPHAGUS: ICD-10-CM

## 2024-06-11 PROCEDURE — 99213 OFFICE O/P EST LOW 20 MIN: CPT

## 2024-06-16 PROBLEM — K22.70 BARRETT'S ESOPHAGUS: Status: ACTIVE | Noted: 2023-08-09

## 2024-10-01 ENCOUNTER — OFFICE (AMBULATORY)
Age: 74
End: 2024-10-01

## 2024-10-01 ENCOUNTER — OFFICE (AMBULATORY)
Dept: URBAN - METROPOLITAN AREA CLINIC 76 | Facility: CLINIC | Age: 74
End: 2024-10-01

## 2024-10-01 VITALS
OXYGEN SATURATION: 96 % | HEART RATE: 66 BPM | SYSTOLIC BLOOD PRESSURE: 122 MMHG | DIASTOLIC BLOOD PRESSURE: 64 MMHG | HEART RATE: 66 BPM | DIASTOLIC BLOOD PRESSURE: 64 MMHG | HEART RATE: 66 BPM | SYSTOLIC BLOOD PRESSURE: 122 MMHG | HEIGHT: 71 IN | HEART RATE: 66 BPM | OXYGEN SATURATION: 96 % | HEIGHT: 71 IN | OXYGEN SATURATION: 96 % | DIASTOLIC BLOOD PRESSURE: 64 MMHG | HEIGHT: 71 IN | WEIGHT: 245 LBS | DIASTOLIC BLOOD PRESSURE: 64 MMHG | HEIGHT: 71 IN | SYSTOLIC BLOOD PRESSURE: 122 MMHG | HEIGHT: 71 IN | OXYGEN SATURATION: 96 % | SYSTOLIC BLOOD PRESSURE: 122 MMHG | WEIGHT: 245 LBS | HEART RATE: 66 BPM | WEIGHT: 245 LBS | OXYGEN SATURATION: 96 % | WEIGHT: 245 LBS | WEIGHT: 245 LBS | DIASTOLIC BLOOD PRESSURE: 64 MMHG | OXYGEN SATURATION: 96 % | SYSTOLIC BLOOD PRESSURE: 122 MMHG | WEIGHT: 245 LBS | HEIGHT: 71 IN | DIASTOLIC BLOOD PRESSURE: 64 MMHG | HEIGHT: 71 IN | HEART RATE: 66 BPM | SYSTOLIC BLOOD PRESSURE: 122 MMHG | HEART RATE: 66 BPM | SYSTOLIC BLOOD PRESSURE: 122 MMHG | OXYGEN SATURATION: 96 % | WEIGHT: 245 LBS | DIASTOLIC BLOOD PRESSURE: 64 MMHG

## 2024-10-01 DIAGNOSIS — K21.9 GASTRO-ESOPHAGEAL REFLUX DISEASE WITHOUT ESOPHAGITIS: ICD-10-CM

## 2024-10-01 DIAGNOSIS — K22.70 BARRETT'S ESOPHAGUS WITHOUT DYSPLASIA: ICD-10-CM

## 2024-10-01 DIAGNOSIS — K59.00 CONSTIPATION, UNSPECIFIED: ICD-10-CM

## 2024-10-01 DIAGNOSIS — R13.10 DYSPHAGIA, UNSPECIFIED: ICD-10-CM

## 2024-10-01 PROCEDURE — 99213 OFFICE O/P EST LOW 20 MIN: CPT

## 2025-02-26 ENCOUNTER — OFFICE (AMBULATORY)
Dept: URBAN - METROPOLITAN AREA CLINIC 76 | Facility: CLINIC | Age: 75
End: 2025-02-26
Payer: MEDICARE

## 2025-02-26 VITALS
HEART RATE: 68 BPM | SYSTOLIC BLOOD PRESSURE: 124 MMHG | OXYGEN SATURATION: 98 % | HEIGHT: 71 IN | DIASTOLIC BLOOD PRESSURE: 70 MMHG | WEIGHT: 255 LBS

## 2025-02-26 DIAGNOSIS — K22.70 BARRETT'S ESOPHAGUS WITHOUT DYSPLASIA: ICD-10-CM

## 2025-02-26 DIAGNOSIS — K59.00 CONSTIPATION, UNSPECIFIED: ICD-10-CM

## 2025-02-26 DIAGNOSIS — K21.9 GASTRO-ESOPHAGEAL REFLUX DISEASE WITHOUT ESOPHAGITIS: ICD-10-CM

## 2025-02-26 PROCEDURE — 99214 OFFICE O/P EST MOD 30 MIN: CPT

## 2025-02-26 RX ORDER — METOCLOPRAMIDE 5 MG/1
10 TABLET ORAL
Qty: 60 | Refills: 11 | Status: ACTIVE
Start: 2025-02-26

## 2025-03-25 ENCOUNTER — OFFICE (AMBULATORY)
Dept: URBAN - METROPOLITAN AREA CLINIC 76 | Facility: CLINIC | Age: 75
End: 2025-03-25
Payer: COMMERCIAL

## 2025-03-25 VITALS
HEIGHT: 71 IN | DIASTOLIC BLOOD PRESSURE: 70 MMHG | HEART RATE: 66 BPM | OXYGEN SATURATION: 95 % | SYSTOLIC BLOOD PRESSURE: 112 MMHG | WEIGHT: 253 LBS

## 2025-03-25 DIAGNOSIS — K21.9 GASTRO-ESOPHAGEAL REFLUX DISEASE WITHOUT ESOPHAGITIS: ICD-10-CM

## 2025-03-25 DIAGNOSIS — K22.70 BARRETT'S ESOPHAGUS WITHOUT DYSPLASIA: ICD-10-CM

## 2025-03-25 PROCEDURE — 99213 OFFICE O/P EST LOW 20 MIN: CPT
